# Patient Record
Sex: MALE | Race: WHITE | NOT HISPANIC OR LATINO | URBAN - METROPOLITAN AREA
[De-identification: names, ages, dates, MRNs, and addresses within clinical notes are randomized per-mention and may not be internally consistent; named-entity substitution may affect disease eponyms.]

---

## 2022-09-28 ENCOUNTER — HOSPITAL ENCOUNTER (EMERGENCY)
Facility: HOSPITAL | Age: 62
Discharge: PSYCHIATRIC OR SUBSTANCE USE FACILITY | End: 2022-09-30
Attending: EMERGENCY MEDICINE
Payer: MEDICAID

## 2022-09-28 DIAGNOSIS — F23 ACUTE PSYCHOSIS: Primary | ICD-10-CM

## 2022-09-28 DIAGNOSIS — Z78.9 ADMITTED TO SUBSTANCE MISUSE DETOXIFICATION CENTER: ICD-10-CM

## 2022-09-28 DIAGNOSIS — F10.10 ALCOHOL ABUSE: ICD-10-CM

## 2022-09-28 LAB
ABS NEUT (OLG): 1.97 X10(3)/MCL (ref 2.1–9.2)
ALBUMIN SERPL-MCNC: 4.3 GM/DL (ref 3.4–4.8)
ALBUMIN/GLOB SERPL: 1.3 RATIO (ref 1.1–2)
ALP SERPL-CCNC: 88 UNIT/L (ref 40–150)
ALT SERPL-CCNC: 81 UNIT/L (ref 0–55)
AMPHET UR QL SCN: NEGATIVE
APAP SERPL-MCNC: <17.4 UG/ML (ref 17.4–30)
APPEARANCE UR: CLEAR
AST SERPL-CCNC: 114 UNIT/L (ref 5–34)
BACTERIA #/AREA URNS AUTO: NORMAL /HPF
BARBITURATE SCN PRESENT UR: NEGATIVE
BENZODIAZ UR QL SCN: NEGATIVE
BILIRUB UR QL STRIP.AUTO: NEGATIVE MG/DL
BILIRUBIN DIRECT+TOT PNL SERPL-MCNC: 1 MG/DL
BUN SERPL-MCNC: 8.1 MG/DL (ref 8.4–25.7)
CALCIUM SERPL-MCNC: 9.3 MG/DL (ref 8.8–10)
CANNABINOIDS UR QL SCN: NEGATIVE
CHLORIDE SERPL-SCNC: 104 MMOL/L (ref 98–107)
CO2 SERPL-SCNC: 23 MMOL/L (ref 23–31)
COCAINE UR QL SCN: NEGATIVE
COLOR UR AUTO: YELLOW
CREAT SERPL-MCNC: 0.7 MG/DL (ref 0.73–1.18)
ERYTHROCYTE [DISTWIDTH] IN BLOOD BY AUTOMATED COUNT: 14.6 % (ref 11.5–17)
ETHANOL SERPL-MCNC: 393 MG/DL
ETHANOL SERPL-MCNC: 497 MG/DL
FENTANYL UR QL SCN: NEGATIVE
GFR SERPLBLD CREATININE-BSD FMLA CKD-EPI: >60 MLS/MIN/1.73/M2
GLOBULIN SER-MCNC: 3.2 GM/DL (ref 2.4–3.5)
GLUCOSE SERPL-MCNC: 96 MG/DL (ref 82–115)
GLUCOSE UR QL STRIP.AUTO: NEGATIVE MG/DL
HCT VFR BLD AUTO: 44.7 % (ref 42–52)
HGB BLD-MCNC: 15.7 GM/DL (ref 14–18)
IMM GRANULOCYTES # BLD AUTO: 0.02 X10(3)/MCL (ref 0–0.04)
IMM GRANULOCYTES NFR BLD AUTO: 0.7 %
INSTRUMENT WBC (OLG): 2.9 X10(3)/MCL
KETONES UR QL STRIP.AUTO: NEGATIVE MG/DL
LEUKOCYTE ESTERASE UR QL STRIP.AUTO: NEGATIVE UNIT/L
LYMPHOCYTES NFR BLD MANUAL: 0.7 X10(3)/MCL
LYMPHOCYTES NFR BLD MANUAL: 24 %
MAGNESIUM SERPL-MCNC: 1.8 MG/DL (ref 1.6–2.6)
MCH RBC QN AUTO: 32 PG (ref 27–31)
MCHC RBC AUTO-ENTMCNC: 35.1 MG/DL (ref 33–36)
MCV RBC AUTO: 91 FL (ref 80–94)
MDMA UR QL SCN: NEGATIVE
METAMYELOCYTES NFR BLD MANUAL: 3 %
MONOCYTES NFR BLD MANUAL: 0.23 X10(3)/MCL (ref 0.1–1.3)
MONOCYTES NFR BLD MANUAL: 8 %
NEUTROPHILS NFR BLD MANUAL: 65 %
NITRITE UR QL STRIP.AUTO: NEGATIVE
NRBC BLD AUTO-RTO: 0 %
OPIATES UR QL SCN: NEGATIVE
PCP UR QL: NEGATIVE
PH UR STRIP.AUTO: 7 [PH]
PH UR: 7 [PH] (ref 3–11)
PLATELET # BLD AUTO: 77 X10(3)/MCL (ref 130–400)
PLATELET # BLD EST: ABNORMAL 10*3/UL
PMV BLD AUTO: 10.1 FL (ref 7.4–10.4)
POTASSIUM SERPL-SCNC: 3.8 MMOL/L (ref 3.5–5.1)
PROT SERPL-MCNC: 7.5 GM/DL (ref 5.8–7.6)
PROT UR QL STRIP.AUTO: ABNORMAL MG/DL
RBC # BLD AUTO: 4.91 X10(6)/MCL (ref 4.7–6.1)
RBC #/AREA URNS AUTO: <5 /HPF
RBC MORPH BLD: ABNORMAL
RBC UR QL AUTO: NEGATIVE UNIT/L
SALICYLATES SERPL-MCNC: <5 MG/DL
SARS-COV-2 RDRP RESP QL NAA+PROBE: NEGATIVE
SODIUM SERPL-SCNC: 144 MMOL/L (ref 136–145)
SP GR UR STRIP.AUTO: 1.01 (ref 1–1.03)
SPECIFIC GRAVITY, URINE AUTO (.000) (OHS): 1.01 (ref 1–1.03)
SQUAMOUS #/AREA URNS AUTO: <5 /HPF
TARGETS BLD QL SMEAR: ABNORMAL
TSH SERPL-ACNC: 0.78 UIU/ML (ref 0.35–4.94)
UROBILINOGEN UR STRIP-ACNC: 1 MG/DL
WBC # SPEC AUTO: 2.9 X10(3)/MCL (ref 4.5–11.5)
WBC #/AREA URNS AUTO: <5 /HPF

## 2022-09-28 PROCEDURE — S4991 NICOTINE PATCH NONLEGEND: HCPCS | Performed by: EMERGENCY MEDICINE

## 2022-09-28 PROCEDURE — 36415 COLL VENOUS BLD VENIPUNCTURE: CPT | Performed by: PHYSICIAN ASSISTANT

## 2022-09-28 PROCEDURE — 85025 COMPLETE CBC W/AUTO DIFF WBC: CPT | Performed by: PHYSICIAN ASSISTANT

## 2022-09-28 PROCEDURE — 63600175 PHARM REV CODE 636 W HCPCS: Performed by: EMERGENCY MEDICINE

## 2022-09-28 PROCEDURE — 87635 SARS-COV-2 COVID-19 AMP PRB: CPT | Performed by: EMERGENCY MEDICINE

## 2022-09-28 PROCEDURE — 36415 COLL VENOUS BLD VENIPUNCTURE: CPT | Performed by: EMERGENCY MEDICINE

## 2022-09-28 PROCEDURE — 82077 ASSAY SPEC XCP UR&BREATH IA: CPT | Performed by: PHYSICIAN ASSISTANT

## 2022-09-28 PROCEDURE — A4216 STERILE WATER/SALINE, 10 ML: HCPCS

## 2022-09-28 PROCEDURE — 80179 DRUG ASSAY SALICYLATE: CPT | Performed by: PHYSICIAN ASSISTANT

## 2022-09-28 PROCEDURE — 96372 THER/PROPH/DIAG INJ SC/IM: CPT | Performed by: EMERGENCY MEDICINE

## 2022-09-28 PROCEDURE — 25000003 PHARM REV CODE 250: Performed by: EMERGENCY MEDICINE

## 2022-09-28 PROCEDURE — 99285 EMERGENCY DEPT VISIT HI MDM: CPT | Mod: 25

## 2022-09-28 PROCEDURE — 25000003 PHARM REV CODE 250

## 2022-09-28 PROCEDURE — 83735 ASSAY OF MAGNESIUM: CPT | Performed by: PHYSICIAN ASSISTANT

## 2022-09-28 PROCEDURE — 80143 DRUG ASSAY ACETAMINOPHEN: CPT | Performed by: PHYSICIAN ASSISTANT

## 2022-09-28 PROCEDURE — 84443 ASSAY THYROID STIM HORMONE: CPT | Performed by: PHYSICIAN ASSISTANT

## 2022-09-28 PROCEDURE — 82077 ASSAY SPEC XCP UR&BREATH IA: CPT | Performed by: EMERGENCY MEDICINE

## 2022-09-28 PROCEDURE — 80307 DRUG TEST PRSMV CHEM ANLYZR: CPT | Performed by: PHYSICIAN ASSISTANT

## 2022-09-28 PROCEDURE — 80053 COMPREHEN METABOLIC PANEL: CPT | Performed by: PHYSICIAN ASSISTANT

## 2022-09-28 PROCEDURE — 81001 URINALYSIS AUTO W/SCOPE: CPT | Mod: 59 | Performed by: PHYSICIAN ASSISTANT

## 2022-09-28 RX ORDER — DIPHENHYDRAMINE HYDROCHLORIDE 50 MG/ML
50 INJECTION INTRAMUSCULAR; INTRAVENOUS EVERY 4 HOURS PRN
Status: DISCONTINUED | OUTPATIENT
Start: 2022-09-28 | End: 2022-09-30 | Stop reason: HOSPADM

## 2022-09-28 RX ORDER — ZOLPIDEM TARTRATE 5 MG/1
10 TABLET ORAL NIGHTLY PRN
Status: DISCONTINUED | OUTPATIENT
Start: 2022-09-28 | End: 2022-09-30 | Stop reason: HOSPADM

## 2022-09-28 RX ORDER — WATER FOR INJECTION,STERILE
VIAL (ML) INJECTION
Status: COMPLETED
Start: 2022-09-28 | End: 2022-09-28

## 2022-09-28 RX ORDER — FOLIC ACID 1 MG/1
1 TABLET ORAL DAILY
Status: DISCONTINUED | OUTPATIENT
Start: 2022-09-29 | End: 2022-09-30 | Stop reason: HOSPADM

## 2022-09-28 RX ORDER — ZIPRASIDONE MESYLATE 20 MG/ML
20 INJECTION, POWDER, LYOPHILIZED, FOR SOLUTION INTRAMUSCULAR EVERY 12 HOURS PRN
Status: DISCONTINUED | OUTPATIENT
Start: 2022-09-28 | End: 2022-09-30 | Stop reason: HOSPADM

## 2022-09-28 RX ORDER — THIAMINE HCL 100 MG
100 TABLET ORAL DAILY
Status: DISCONTINUED | OUTPATIENT
Start: 2022-09-29 | End: 2022-09-30 | Stop reason: HOSPADM

## 2022-09-28 RX ORDER — IBUPROFEN 200 MG
1 TABLET ORAL
Status: COMPLETED | OUTPATIENT
Start: 2022-09-28 | End: 2022-09-29

## 2022-09-28 RX ORDER — CHLORDIAZEPOXIDE HYDROCHLORIDE 25 MG/1
100 CAPSULE, GELATIN COATED ORAL ONCE
Status: COMPLETED | OUTPATIENT
Start: 2022-09-28 | End: 2022-09-28

## 2022-09-28 RX ORDER — LORAZEPAM 1 MG/1
2 TABLET ORAL EVERY 4 HOURS PRN
Status: DISCONTINUED | OUTPATIENT
Start: 2022-09-28 | End: 2022-09-30 | Stop reason: HOSPADM

## 2022-09-28 RX ORDER — OLANZAPINE 5 MG/1
10 TABLET, ORALLY DISINTEGRATING ORAL EVERY 8 HOURS PRN
Status: DISCONTINUED | OUTPATIENT
Start: 2022-09-28 | End: 2022-09-30 | Stop reason: HOSPADM

## 2022-09-28 RX ORDER — HALOPERIDOL 5 MG/1
5 TABLET ORAL EVERY 8 HOURS PRN
Status: DISCONTINUED | OUTPATIENT
Start: 2022-09-28 | End: 2022-09-30 | Stop reason: HOSPADM

## 2022-09-28 RX ORDER — HALOPERIDOL 5 MG/ML
5 INJECTION INTRAMUSCULAR EVERY 4 HOURS PRN
Status: DISCONTINUED | OUTPATIENT
Start: 2022-09-28 | End: 2022-09-30 | Stop reason: HOSPADM

## 2022-09-28 RX ORDER — ACETAMINOPHEN 325 MG/1
650 TABLET ORAL EVERY 6 HOURS PRN
Status: DISCONTINUED | OUTPATIENT
Start: 2022-09-28 | End: 2022-09-30 | Stop reason: HOSPADM

## 2022-09-28 RX ORDER — CHLORDIAZEPOXIDE HYDROCHLORIDE 25 MG/1
25 CAPSULE, GELATIN COATED ORAL
Status: DISCONTINUED | OUTPATIENT
Start: 2022-09-30 | End: 2022-09-30 | Stop reason: HOSPADM

## 2022-09-28 RX ORDER — CHLORDIAZEPOXIDE HYDROCHLORIDE 25 MG/1
50 CAPSULE, GELATIN COATED ORAL
Status: COMPLETED | OUTPATIENT
Start: 2022-09-28 | End: 2022-09-29

## 2022-09-28 RX ORDER — CHLORDIAZEPOXIDE HYDROCHLORIDE 25 MG/1
25 CAPSULE, GELATIN COATED ORAL
Status: DISCONTINUED | OUTPATIENT
Start: 2022-10-02 | End: 2022-09-30 | Stop reason: HOSPADM

## 2022-09-28 RX ORDER — DIPHENHYDRAMINE HCL 25 MG
50 CAPSULE ORAL EVERY 4 HOURS PRN
Status: DISCONTINUED | OUTPATIENT
Start: 2022-09-28 | End: 2022-09-30 | Stop reason: HOSPADM

## 2022-09-28 RX ORDER — CHLORDIAZEPOXIDE HYDROCHLORIDE 25 MG/1
25 CAPSULE, GELATIN COATED ORAL
Status: DISCONTINUED | OUTPATIENT
Start: 2022-10-03 | End: 2022-09-30 | Stop reason: HOSPADM

## 2022-09-28 RX ORDER — CHLORDIAZEPOXIDE HYDROCHLORIDE 25 MG/1
50 CAPSULE, GELATIN COATED ORAL
Status: DISCONTINUED | OUTPATIENT
Start: 2022-09-29 | End: 2022-09-30 | Stop reason: HOSPADM

## 2022-09-28 RX ORDER — CHLORDIAZEPOXIDE HYDROCHLORIDE 25 MG/1
25 CAPSULE, GELATIN COATED ORAL
Status: DISCONTINUED | OUTPATIENT
Start: 2022-10-01 | End: 2022-09-30 | Stop reason: HOSPADM

## 2022-09-28 RX ORDER — MAG HYDROX/ALUMINUM HYD/SIMETH 200-200-20
30 SUSPENSION, ORAL (FINAL DOSE FORM) ORAL EVERY 6 HOURS PRN
Status: DISCONTINUED | OUTPATIENT
Start: 2022-09-28 | End: 2022-09-30 | Stop reason: HOSPADM

## 2022-09-28 RX ORDER — LORAZEPAM 1 MG/1
2 TABLET ORAL EVERY 8 HOURS PRN
Status: DISCONTINUED | OUTPATIENT
Start: 2022-09-28 | End: 2022-09-30 | Stop reason: HOSPADM

## 2022-09-28 RX ADMIN — LORAZEPAM 2 MG: 1 TABLET ORAL at 04:09

## 2022-09-28 RX ADMIN — CHLORDIAZEPOXIDE HYDROCHLORIDE 100 MG: 25 CAPSULE ORAL at 05:09

## 2022-09-28 RX ADMIN — ACETAMINOPHEN 650 MG: 325 TABLET ORAL at 10:09

## 2022-09-28 RX ADMIN — CHLORDIAZEPOXIDE HYDROCHLORIDE 50 MG: 25 CAPSULE ORAL at 10:09

## 2022-09-28 RX ADMIN — WATER: 1 INJECTION INTRAMUSCULAR; INTRAVENOUS; SUBCUTANEOUS at 05:09

## 2022-09-28 RX ADMIN — ZIPRASIDONE MESYLATE 20 MG: 20 INJECTION, POWDER, LYOPHILIZED, FOR SOLUTION INTRAMUSCULAR at 05:09

## 2022-09-28 RX ADMIN — NICOTINE 1 PATCH: 21 PATCH, EXTENDED RELEASE TRANSDERMAL at 05:09

## 2022-09-28 NOTE — ED PROVIDER NOTES
"Encounter Date: 9/28/2022    SCRIBE #1 NOTE: I, Beth Cruz, am scribing for, and in the presence of,  Natty Terry, DO. I have scribed the following portions of the note - Other sections scribed: HPI, ROS, PE.     History     Chief Complaint   Patient presents with    Alcohol Problem     Pt presents requesting ETOH detox. Denies SI/HI.  / alcohol of choice, whiskey.       63 y/o male with history of alcohol abuse presents to the ED for psychiatric evaluation and alcohol problem. Pt reports that he is a [employer], stating "you don't want to know" when asked what happened to bring him in to the ED. He repeatedly stated that he is "tired of fighting this fight every day" and that the things he has seen "get in your head and destroys you." He says he drinks every day, and when asked how much he drinks he said, "enough to knock me down." [facility]" when asked if he wants help with his drinking problem. He denies thoughts of hurting himself but says he "has thoughts of defending myself from the other warriors and everyone around me." He denies nausea.     The history is provided by the patient. No  was used.   Mental Health Problem  The primary symptoms include agitation. The primary symptoms do not include suicidal ideas. This is a chronic problem.   The onset of the illness is precipitated by alcohol abuse and stressful event. The degree of incapacity that he is experiencing as a consequence of his illness is severe. Additional symptoms of the illness include agitation. Additional symptoms of the illness do not include appetite change, abdominal pain or seizures. He does not admit to suicidal ideas. He does not contemplate harming himself. Risk factors that are present for mental illness include substance abuse.   Review of patient's allergies indicates:  No Known Allergies  Past Medical History:   Diagnosis Date    " Alcohol abuse     Crohn disease      History reviewed. No pertinent surgical history.  History reviewed. No pertinent family history.  Social History     Tobacco Use    Smoking status: Every Day     Types: Cigarettes   Substance Use Topics    Alcohol use: Yes     Comment: daily     Review of Systems   Constitutional:  Negative for appetite change, chills and fever.   HENT:  Negative for congestion and sore throat.    Eyes:  Negative for pain and visual disturbance.   Respiratory:  Negative for cough and shortness of breath.    Cardiovascular:  Negative for chest pain and leg swelling.   Gastrointestinal:  Negative for abdominal pain, diarrhea, nausea and vomiting.   Genitourinary:  Negative for dysuria and hematuria.   Skin:  Negative for rash and wound.   Allergic/Immunologic: Negative for food allergies and immunocompromised state.   Neurological:  Negative for seizures, syncope and weakness.   Psychiatric/Behavioral:  Positive for agitation. Negative for suicidal ideas.      Physical Exam     Initial Vitals [09/28/22 1545]   BP Pulse Resp Temp SpO2   (!) 143/91 101 (!) 22 97.2 °F (36.2 °C) 95 %      MAP       --         Physical Exam    Constitutional: He appears well-developed and well-nourished. He is not diaphoretic.  Non-toxic appearance. He does not appear ill. No distress.   HENT:   Head: Normocephalic and atraumatic.   Right Ear: External ear normal.   Left Ear: External ear normal.   Nose: Nose normal.   Mouth/Throat: No trismus in the jaw.   Eyes: EOM are normal. Pupils are equal, round, and reactive to light. Right conjunctiva is injected. Left conjunctiva is injected. No scleral icterus.   Neck: Neck supple. No tracheal deviation present.   Normal range of motion.  Cardiovascular:  Normal rate, regular rhythm, normal heart sounds and normal pulses.           Pulmonary/Chest: Effort normal and breath sounds normal.   Abdominal: Abdomen is soft and flat. There is no abdominal tenderness.    Musculoskeletal:         General: No edema.      Cervical back: Normal range of motion and neck supple.      Right lower leg: No edema.      Left lower leg: No edema.     Neurological: He is alert and oriented to person, place, and time. GCS eye subscore is 4. GCS verbal subscore is 5. GCS motor subscore is 6.   Slurred speech   Skin: Skin is warm and dry. Capillary refill takes less than 2 seconds. No rash noted. No pallor.   Psychiatric: His affect is angry. He is agitated and actively hallucinating. He expresses impulsivity. He exhibits a depressed mood. He expresses no suicidal ideation.       ED Course   Procedures  Labs Reviewed   COMPREHENSIVE METABOLIC PANEL - Abnormal; Notable for the following components:       Result Value    Blood Urea Nitrogen 8.1 (*)     Creatinine 0.70 (*)     Alanine Aminotransferase 81 (*)     Aspartate Aminotransferase 114 (*)     All other components within normal limits   URINALYSIS, REFLEX TO URINE CULTURE - Abnormal; Notable for the following components:    Protein, UA 1+ (*)     All other components within normal limits   ALCOHOL,MEDICAL (ETHANOL) - Abnormal; Notable for the following components:    Ethanol Level 497.0 (*)     All other components within normal limits   ACETAMINOPHEN LEVEL - Abnormal; Notable for the following components:    Acetaminophen Level <17.4 (*)     All other components within normal limits   CBC WITH DIFFERENTIAL - Abnormal; Notable for the following components:    WBC 2.9 (*)     MCH 32.0 (*)     Platelet 77 (*)     All other components within normal limits   MANUAL DIFFERENTIAL - Abnormal; Notable for the following components:    Abs Neut 1.972 (*)     RBC Morph Abnormal (*)     Target Cell 1+ (*)     Platelet Est Decreased (*)     All other components within normal limits   ALCOHOL,MEDICAL (ETHANOL) - Abnormal; Notable for the following components:    Ethanol Level 393.0 (*)     All other components within normal limits   ALCOHOL,MEDICAL  (ETHANOL) - Abnormal; Notable for the following components:    Ethanol Level 281.0 (*)     All other components within normal limits   ALCOHOL,MEDICAL (ETHANOL) - Abnormal; Notable for the following components:    Ethanol Level 137.0 (*)     All other components within normal limits   TSH - Normal   DRUG SCREEN, URINE (BEAKER) - Normal    Narrative:     Cut off concentrations:    Amphetamines - 1000 ng/ml  Barbiturates - 200 ng/ml  Benzodiazepine - 200 ng/ml  Cannabinoids (THC) - 50 ng/ml  Cocaine - 300 ng/ml  Fentanyl - 1.0 ng/ml  MDMA - 500 ng/ml  Opiates - 300 ng/ml   Phencyclidine (PCP) - 25 ng/ml    Specimen submitted for drug analysis and tested for pH and specific gravity in order to evaluate sample integrity. Suspect tampering if specific gravity is <1.003 and/or pH is not within the range of 4.5 - 8.0  False negatives may result form substances such as bleach added to urine.  False positives may result for the presence of a substance with similar chemical structure to the drug or its metabolite.    This test provides only a PRELIMINARY analytical test result. A more specific alternate chemical method must be used in order to obtain a confirmed analytical result. Gas chromatography/mass spectrometry (GC/MS) is the preferred confirmatory method. Other chemical confirmation methods are available. Clinical consideration and professional judgement should be applied to any drug of abuse test result, particularly when preliminary positive results are used.    Positive results will be confirmed only at the physicians request. Unconfirmed screening results are to be used only for medical purposes (treatment).        MAGNESIUM - Normal   URINALYSIS, MICROSCOPIC - Normal   SARS-COV-2 RNA AMPLIFICATION, QUAL - Normal   CBC W/ AUTO DIFFERENTIAL    Narrative:     The following orders were created for panel order CBC auto differential.  Procedure                               Abnormality         Status                      ---------                               -----------         ------                     CBC with Differential[[id number]]        Abnormal            Final result               Manual Differential[[id number]]          Abnormal            Final result                 Please view results for these tests on the individual orders.   SALICYLATE LEVEL          Imaging Results    None          Medications   ziprasidone injection 20 mg (20 mg Intramuscular Given 9/28/22 1701)   LORazepam tablet 2 mg (has no administration in time range)   olanzapine zydis disintegrating tablet 10 mg (has no administration in time range)   acetaminophen tablet 650 mg (650 mg Oral Given 9/28/22 2257)   aluminum-magnesium hydroxide-simethicone 200-200-20 mg/5 mL suspension 30 mL (has no administration in time range)   zolpidem tablet 10 mg (10 mg Oral Given 9/29/22 0351)   haloperidoL tablet 5 mg (has no administration in time range)   LORazepam tablet 2 mg (2 mg Oral Given 9/29/22 0806)   diphenhydrAMINE capsule 50 mg (has no administration in time range)   haloperidol lactate injection 5 mg (has no administration in time range)   diphenhydrAMINE injection 50 mg (has no administration in time range)   multivitamin tablet (has no administration in time range)   folic acid tablet 1 mg (has no administration in time range)   thiamine tablet 100 mg (has no administration in time range)   chlordiazepoxide capsule 100 mg (100 mg Oral Given 9/28/22 1715)     Followed by   chlordiazepoxide capsule 50 mg (50 mg Oral Given 9/29/22 0551)     Followed by   chlordiazepoxide capsule 50 mg (has no administration in time range)     Followed by   chlordiazepoxide capsule 25 mg (has no administration in time range)     Followed by   chlordiazepoxide capsule 25 mg (has no administration in time range)     Followed by   chlordiazepoxide capsule 25 mg (has no administration in time range)     Followed by   chlordiazepoxide capsule 25 mg (has no administration in  time range)   nicotine 21 mg/24 hr 1 patch (1 patch Transdermal Patch Applied 9/28/22 1730)   sterile water for injection injection (  Given 9/28/22 1715)     Medical Decision Making:   Initial Assessment:   63 yo male here for alcohol detox, he is currently intoxicated,  hallucinating, irrational. He appears gravely disabled so put under [facility]   Clinical Tests:   Lab Tests: Ordered and Reviewed       <> Summary of Lab: Minimal LFT elevation, thrombocytopenia, elevated alcohol level  ED Management:  Started Librium taper   Will observe until alcohol level is below psych requirements   He is medically clear for psych facility           Attending Attestation:           Physician Attestation for Scribe:  Physician Attestation Statement for Scribe #1: I, Natty Terry, DO, reviewed documentation, as scribed by Beth Cruz in my presence, and it is both accurate and complete.           ED Course as of 09/29/22 0900   Wed Sep 28, 2022   1734 Alcohol, Serum(!!): 497.0 [KM]   2034 Alcohol, Serum(!!): 393.0  Will repeat in 6 hours  [KM]   u Sep 29, 2022   0418 Alcohol, Serum(!): 281.0  Will repeat at 0700   [RB]   0845 Alcohol, Serum(!): 137.0  Patient is resting comfortably at this time, receiving Serax protocol. Alcohol level at goal. He is medically clear for transfer to a psychiatric facility for further evaluation.  [RB]      ED Course User Index  [KM] Natty Terry MD  [RB] Piedad Rodríguez MD                 Clinical Impression:   Final diagnoses:  [F10.10] Alcohol abuse  [F23] Acute psychosis (Primary)             Piedad Rodríguez MD  09/29/22 0900

## 2022-09-28 NOTE — FIRST PROVIDER EVALUATION
Medical screening examination initiated.  I have conducted a focused provider triage encounter, findings are as follows:    Chief Complaint   Patient presents with    Alcohol Problem     Pt presents requesting ETOH detox. Denies SI/HI.  / alcohol of choice, whiskey.       Brief history of present illness:  62 y.o. male presents to the ED with friend for ETOH detox.     Vitals:    09/28/22 1545   BP: (!) 143/91   BP Location: Left arm   Patient Position: Sitting   Pulse: 101   Resp: (!) 22   Temp: 97.2 °F (36.2 °C)   TempSrc: Temporal   SpO2: 95%   Weight: 90.7 kg (200 lb)     Pertinent physical exam:  Awake, alert, non-labored respirations    Brief workup plan:  labs, UA    Preliminary workup initiated; this workup will be continued and followed by the physician or advanced practice provider that is assigned to the patient when roomed.

## 2022-09-29 LAB
ETHANOL SERPL-MCNC: 137 MG/DL
ETHANOL SERPL-MCNC: 281 MG/DL
ETHANOL SERPL-MCNC: <10 MG/DL

## 2022-09-29 PROCEDURE — 93010 EKG 12-LEAD: ICD-10-PCS | Mod: ,,, | Performed by: INTERNAL MEDICINE

## 2022-09-29 PROCEDURE — 36415 COLL VENOUS BLD VENIPUNCTURE: CPT | Performed by: EMERGENCY MEDICINE

## 2022-09-29 PROCEDURE — 25000003 PHARM REV CODE 250: Performed by: EMERGENCY MEDICINE

## 2022-09-29 PROCEDURE — 63600175 PHARM REV CODE 636 W HCPCS

## 2022-09-29 PROCEDURE — 82077 ASSAY SPEC XCP UR&BREATH IA: CPT | Performed by: EMERGENCY MEDICINE

## 2022-09-29 PROCEDURE — 96374 THER/PROPH/DIAG INJ IV PUSH: CPT

## 2022-09-29 PROCEDURE — 93010 ELECTROCARDIOGRAM REPORT: CPT | Mod: ,,, | Performed by: INTERNAL MEDICINE

## 2022-09-29 PROCEDURE — 93005 ELECTROCARDIOGRAM TRACING: CPT

## 2022-09-29 RX ORDER — ONDANSETRON 2 MG/ML
INJECTION INTRAMUSCULAR; INTRAVENOUS
Status: COMPLETED
Start: 2022-09-29 | End: 2022-09-29

## 2022-09-29 RX ORDER — ONDANSETRON 2 MG/ML
4 INJECTION INTRAMUSCULAR; INTRAVENOUS
Status: COMPLETED | OUTPATIENT
Start: 2022-09-29 | End: 2022-09-29

## 2022-09-29 RX ADMIN — LORAZEPAM 2 MG: 1 TABLET ORAL at 02:09

## 2022-09-29 RX ADMIN — ONDANSETRON 4 MG: 2 INJECTION INTRAMUSCULAR; INTRAVENOUS at 11:09

## 2022-09-29 RX ADMIN — CHLORDIAZEPOXIDE HYDROCHLORIDE 50 MG: 25 CAPSULE ORAL at 10:09

## 2022-09-29 RX ADMIN — CHLORDIAZEPOXIDE HYDROCHLORIDE 50 MG: 25 CAPSULE ORAL at 05:09

## 2022-09-29 RX ADMIN — LORAZEPAM 2 MG: 1 TABLET ORAL at 07:09

## 2022-09-29 RX ADMIN — ZOLPIDEM TARTRATE 10 MG: 5 TABLET ORAL at 03:09

## 2022-09-29 RX ADMIN — Medication 100 MG: at 10:09

## 2022-09-29 RX ADMIN — THERA TABS 1 TABLET: TAB at 10:09

## 2022-09-29 RX ADMIN — FOLIC ACID 1 MG: 1 TABLET ORAL at 10:09

## 2022-09-29 RX ADMIN — LORAZEPAM 2 MG: 1 TABLET ORAL at 08:09

## 2022-09-30 VITALS
SYSTOLIC BLOOD PRESSURE: 156 MMHG | DIASTOLIC BLOOD PRESSURE: 116 MMHG | WEIGHT: 200 LBS | HEART RATE: 85 BPM | OXYGEN SATURATION: 98 % | TEMPERATURE: 98 F | RESPIRATION RATE: 16 BRPM

## 2022-09-30 PROCEDURE — 25000003 PHARM REV CODE 250: Performed by: EMERGENCY MEDICINE

## 2022-09-30 RX ADMIN — CHLORDIAZEPOXIDE HYDROCHLORIDE 50 MG: 25 CAPSULE ORAL at 07:09

## 2022-09-30 RX ADMIN — THERA TABS 1 TABLET: TAB at 09:09

## 2022-09-30 RX ADMIN — Medication 100 MG: at 09:09

## 2022-09-30 RX ADMIN — FOLIC ACID 1 MG: 1 TABLET ORAL at 09:09

## 2022-09-30 NOTE — ED NOTES
Pt requesting to be [facility]. [place] notified of pt request. Informed by [facility] [place] [facility]  [facility]. Pt verbalized understanding.

## 2022-09-30 NOTE — ED NOTES
Left forearm injury close to left wrist while playing ice hockey tonight. Pt was tripped and fell on the ice with his hand hyperextended. +CMS. Pain with movement. Report called to GARCÍA Campbell, report given to Cordell. Accepted by Olman Knight. [facility] notified of need for transport of [facility] patient.

## 2022-11-09 ENCOUNTER — HOSPITAL ENCOUNTER (EMERGENCY)
Facility: HOSPITAL | Age: 62
Discharge: PSYCHIATRIC OR SUBSTANCE USE FACILITY | End: 2022-11-10
Attending: STUDENT IN AN ORGANIZED HEALTH CARE EDUCATION/TRAINING PROGRAM
Payer: MEDICAID

## 2022-11-09 DIAGNOSIS — F10.129 ALCOHOL ABUSE WITH INTOXICATION: ICD-10-CM

## 2022-11-09 DIAGNOSIS — F99 PSYCHIATRIC SYMPTOMS: ICD-10-CM

## 2022-11-09 DIAGNOSIS — F43.10 PTSD (POST-TRAUMATIC STRESS DISORDER): Primary | ICD-10-CM

## 2022-11-09 DIAGNOSIS — Z00.8 MEDICAL CLEARANCE FOR PSYCHIATRIC ADMISSION: ICD-10-CM

## 2022-11-09 LAB
ABS NEUT (OLG): 1.75 X10(3)/MCL (ref 2.1–9.2)
ALBUMIN SERPL-MCNC: 4.3 GM/DL (ref 3.4–4.8)
ALBUMIN/GLOB SERPL: 1.3 RATIO (ref 1.1–2)
ALP SERPL-CCNC: 77 UNIT/L (ref 40–150)
ALT SERPL-CCNC: 61 UNIT/L (ref 0–55)
AMPHET UR QL SCN: NEGATIVE
APAP SERPL-MCNC: <17.4 UG/ML (ref 17.4–30)
APPEARANCE UR: CLEAR
AST SERPL-CCNC: 75 UNIT/L (ref 5–34)
BACTERIA #/AREA URNS AUTO: NORMAL /HPF
BARBITURATE SCN PRESENT UR: NEGATIVE
BASOPHILS NFR BLD MANUAL: 0.11 X10(3)/MCL (ref 0–0.2)
BASOPHILS NFR BLD MANUAL: 3 %
BENZODIAZ UR QL SCN: NEGATIVE
BILIRUB UR QL STRIP.AUTO: NEGATIVE MG/DL
BILIRUBIN DIRECT+TOT PNL SERPL-MCNC: 0.8 MG/DL
BUN SERPL-MCNC: 7.1 MG/DL (ref 8.4–25.7)
CALCIUM SERPL-MCNC: 9.8 MG/DL (ref 8.8–10)
CANNABINOIDS UR QL SCN: NEGATIVE
CHLORIDE SERPL-SCNC: 110 MMOL/L (ref 98–107)
CO2 SERPL-SCNC: 25 MMOL/L (ref 23–31)
COCAINE UR QL SCN: NEGATIVE
COLOR UR AUTO: YELLOW
CREAT SERPL-MCNC: 0.74 MG/DL (ref 0.73–1.18)
EOSINOPHIL NFR BLD MANUAL: 0.11 X10(3)/MCL (ref 0–0.9)
EOSINOPHIL NFR BLD MANUAL: 3 %
ERYTHROCYTE [DISTWIDTH] IN BLOOD BY AUTOMATED COUNT: 14.4 % (ref 11.5–17)
ETHANOL SERPL-MCNC: 275 MG/DL
ETHANOL SERPL-MCNC: 402 MG/DL
ETHANOL SERPL-MCNC: 86 MG/DL
FENTANYL UR QL SCN: NEGATIVE
FLUAV AG UPPER RESP QL IA.RAPID: NOT DETECTED
FLUBV AG UPPER RESP QL IA.RAPID: NOT DETECTED
GFR SERPLBLD CREATININE-BSD FMLA CKD-EPI: >60 MLS/MIN/1.73/M2
GLOBULIN SER-MCNC: 3.2 GM/DL (ref 2.4–3.5)
GLUCOSE SERPL-MCNC: 103 MG/DL (ref 82–115)
GLUCOSE UR QL STRIP.AUTO: NEGATIVE MG/DL
HCT VFR BLD AUTO: 43.5 % (ref 42–52)
HGB BLD-MCNC: 14.8 GM/DL (ref 14–18)
IMM GRANULOCYTES # BLD AUTO: 0.01 X10(3)/MCL (ref 0–0.04)
IMM GRANULOCYTES NFR BLD AUTO: 0.3 %
INSTRUMENT WBC (OLG): 3.8 X10(3)/MCL
KETONES UR QL STRIP.AUTO: NEGATIVE MG/DL
LEUKOCYTE ESTERASE UR QL STRIP.AUTO: ABNORMAL UNIT/L
LYMPHOCYTES NFR BLD MANUAL: 0.91 X10(3)/MCL
LYMPHOCYTES NFR BLD MANUAL: 24 %
MCH RBC QN AUTO: 32 PG (ref 27–31)
MCHC RBC AUTO-ENTMCNC: 34 MG/DL (ref 33–36)
MCV RBC AUTO: 94 FL (ref 80–94)
MDMA UR QL SCN: NEGATIVE
MONOCYTES NFR BLD MANUAL: 0.99 X10(3)/MCL (ref 0.1–1.3)
MONOCYTES NFR BLD MANUAL: 26 %
NEUTROPHILS NFR BLD MANUAL: 46 %
NITRITE UR QL STRIP.AUTO: NEGATIVE
NRBC BLD AUTO-RTO: 0 %
OPIATES UR QL SCN: NEGATIVE
PCP UR QL: NEGATIVE
PH UR STRIP.AUTO: 6 [PH]
PH UR: 6 [PH] (ref 3–11)
PLATELET # BLD AUTO: 355 X10(3)/MCL (ref 130–400)
PLATELET # BLD EST: NORMAL 10*3/UL
PMV BLD AUTO: 9.3 FL (ref 7.4–10.4)
POTASSIUM SERPL-SCNC: 4.2 MMOL/L (ref 3.5–5.1)
PROT SERPL-MCNC: 7.5 GM/DL (ref 5.8–7.6)
PROT UR QL STRIP.AUTO: NEGATIVE MG/DL
RBC # BLD AUTO: 4.63 X10(6)/MCL (ref 4.7–6.1)
RBC #/AREA URNS AUTO: <5 /HPF
RBC MORPH BLD: ABNORMAL
RBC UR QL AUTO: NEGATIVE UNIT/L
SALICYLATES SERPL-MCNC: <5 MG/DL
SARS-COV-2 RNA RESP QL NAA+PROBE: NOT DETECTED
SODIUM SERPL-SCNC: 145 MMOL/L (ref 136–145)
SP GR UR STRIP.AUTO: 1.01 (ref 1–1.03)
SPECIFIC GRAVITY, URINE AUTO (.000) (OHS): 1.01 (ref 1–1.03)
SQUAMOUS #/AREA URNS AUTO: <5 /HPF
TARGETS BLD QL SMEAR: ABNORMAL
TSH SERPL-ACNC: 0.6 UIU/ML (ref 0.35–4.94)
UROBILINOGEN UR STRIP-ACNC: 0.2 MG/DL
WBC # SPEC AUTO: 3.8 X10(3)/MCL (ref 4.5–11.5)
WBC #/AREA URNS AUTO: <5 /HPF

## 2022-11-09 PROCEDURE — 81001 URINALYSIS AUTO W/SCOPE: CPT | Performed by: EMERGENCY MEDICINE

## 2022-11-09 PROCEDURE — 80143 DRUG ASSAY ACETAMINOPHEN: CPT | Performed by: EMERGENCY MEDICINE

## 2022-11-09 PROCEDURE — 0241U COVID/FLU A&B PCR: CPT | Performed by: STUDENT IN AN ORGANIZED HEALTH CARE EDUCATION/TRAINING PROGRAM

## 2022-11-09 PROCEDURE — 99285 EMERGENCY DEPT VISIT HI MDM: CPT | Mod: 25

## 2022-11-09 PROCEDURE — 93010 EKG 12-LEAD: ICD-10-PCS | Mod: ,,, | Performed by: INTERNAL MEDICINE

## 2022-11-09 PROCEDURE — 84443 ASSAY THYROID STIM HORMONE: CPT | Performed by: EMERGENCY MEDICINE

## 2022-11-09 PROCEDURE — 85007 BL SMEAR W/DIFF WBC COUNT: CPT | Performed by: EMERGENCY MEDICINE

## 2022-11-09 PROCEDURE — 93010 ELECTROCARDIOGRAM REPORT: CPT | Mod: ,,, | Performed by: INTERNAL MEDICINE

## 2022-11-09 PROCEDURE — 25000003 PHARM REV CODE 250: Performed by: EMERGENCY MEDICINE

## 2022-11-09 PROCEDURE — 80179 DRUG ASSAY SALICYLATE: CPT | Performed by: EMERGENCY MEDICINE

## 2022-11-09 PROCEDURE — 82077 ASSAY SPEC XCP UR&BREATH IA: CPT | Performed by: EMERGENCY MEDICINE

## 2022-11-09 PROCEDURE — 80053 COMPREHEN METABOLIC PANEL: CPT | Performed by: EMERGENCY MEDICINE

## 2022-11-09 PROCEDURE — 80307 DRUG TEST PRSMV CHEM ANLYZR: CPT | Performed by: EMERGENCY MEDICINE

## 2022-11-09 PROCEDURE — 93005 ELECTROCARDIOGRAM TRACING: CPT

## 2022-11-09 RX ORDER — LORAZEPAM 1 MG/1
2 TABLET ORAL EVERY 4 HOURS PRN
Status: DISCONTINUED | OUTPATIENT
Start: 2022-11-09 | End: 2022-11-10 | Stop reason: HOSPADM

## 2022-11-09 RX ORDER — CHLORDIAZEPOXIDE HYDROCHLORIDE 25 MG/1
25 CAPSULE, GELATIN COATED ORAL 4 TIMES DAILY PRN
Status: DISCONTINUED | OUTPATIENT
Start: 2022-11-09 | End: 2022-11-10 | Stop reason: HOSPADM

## 2022-11-09 RX ORDER — DIPHENHYDRAMINE HYDROCHLORIDE 50 MG/ML
50 INJECTION INTRAMUSCULAR; INTRAVENOUS EVERY 4 HOURS PRN
Status: DISCONTINUED | OUTPATIENT
Start: 2022-11-09 | End: 2022-11-10 | Stop reason: HOSPADM

## 2022-11-09 RX ORDER — HALOPERIDOL 5 MG/1
5 TABLET ORAL EVERY 4 HOURS PRN
Status: DISCONTINUED | OUTPATIENT
Start: 2022-11-09 | End: 2022-11-10 | Stop reason: HOSPADM

## 2022-11-09 RX ORDER — CHLORDIAZEPOXIDE HYDROCHLORIDE 25 MG/1
100 CAPSULE, GELATIN COATED ORAL
Status: COMPLETED | OUTPATIENT
Start: 2022-11-09 | End: 2022-11-09

## 2022-11-09 RX ORDER — ZIPRASIDONE MESYLATE 20 MG/ML
10 INJECTION, POWDER, LYOPHILIZED, FOR SOLUTION INTRAMUSCULAR
Status: DISPENSED | OUTPATIENT
Start: 2022-11-09 | End: 2022-11-09

## 2022-11-09 RX ORDER — FOLIC ACID 1 MG/1
1 TABLET ORAL DAILY
Status: DISCONTINUED | OUTPATIENT
Start: 2022-11-09 | End: 2022-11-10 | Stop reason: HOSPADM

## 2022-11-09 RX ORDER — DIPHENHYDRAMINE HCL 25 MG
50 CAPSULE ORAL EVERY 4 HOURS PRN
Status: DISCONTINUED | OUTPATIENT
Start: 2022-11-09 | End: 2022-11-10 | Stop reason: HOSPADM

## 2022-11-09 RX ORDER — IBUPROFEN 200 MG
1 TABLET ORAL DAILY
Status: DISCONTINUED | OUTPATIENT
Start: 2022-11-09 | End: 2022-11-10 | Stop reason: HOSPADM

## 2022-11-09 RX ADMIN — THERA TABS 1 TABLET: TAB at 11:11

## 2022-11-09 RX ADMIN — CHLORDIAZEPOXIDE HYDROCHLORIDE 25 MG: 25 CAPSULE ORAL at 08:11

## 2022-11-09 RX ADMIN — LORAZEPAM 2 MG: 1 TABLET ORAL at 08:11

## 2022-11-09 RX ADMIN — CHLORDIAZEPOXIDE HYDROCHLORIDE 100 MG: 25 CAPSULE ORAL at 11:11

## 2022-11-09 RX ADMIN — FOLIC ACID 1 MG: 1 TABLET ORAL at 11:11

## 2022-11-09 NOTE — ED PROVIDER NOTES
Encounter Date: 11/9/2022       History     Chief Complaint   Patient presents with    Alcohol Intoxication     +ETOH - reports drinking since 0300. Unsure how much he drank. Hx of PTSD. Denies SI/HI. Reports having flashbacks of trauma he has dealt with in life. Pt seeking help.      62-year-old male with history of PTSD and alcohol abuse presenting today with alcohol intoxication.  Patient has been drinking since 3:00 a.m. an attempt to get rid of his flashbacks.  He denies suicidal intent.  He reports he was in the [place] and saw terrible things that we do not understand. He is asking for help because he does not want to die a failure.     The history is provided by the EMS personnel and the patient.   Mental Health Problem  The primary symptoms include aggression, delusions and hallucinations. The primary symptoms do not include suicidal ideas. The current episode started today. This is a recurrent problem.   The onset of the illness is precipitated by alcohol abuse, emotional stress and stressful event. The degree of incapacity that he is experiencing as a consequence of his illness is severe. Additional symptoms of the illness include feelings of worthlessness and poor judgment. He does not admit to suicidal ideas. He does not have a plan to attempt suicide. He does not contemplate harming himself. He has not already injured self. He does not contemplate injuring another person. He has not already  injured another person. Risk factors that are present for mental illness include a history of mental illness and substance abuse.   Review of patient's allergies indicates:  No Known Allergies  Past Medical History:   Diagnosis Date    Alcohol abuse     Crohn disease     PTSD (post-traumatic stress disorder)      History reviewed. No pertinent surgical history.  History reviewed. No pertinent family history.  Social History     Tobacco Use    Smoking status: Every Day     Types: Cigarettes   Substance Use Topics     Alcohol use: Yes     Comment: daily     Review of Systems   Unable to perform ROS: Other (intoxicated)   Constitutional:  Negative for fever.   Gastrointestinal:  Negative for diarrhea and vomiting.   Psychiatric/Behavioral:  Positive for hallucinations. Negative for suicidal ideas.      Physical Exam     Initial Vitals [11/09/22 0927]   BP Pulse Resp Temp SpO2   (!) 147/94 87 15 98.1 °F (36.7 °C) 97 %      MAP       --         Physical Exam    Nursing note and vitals reviewed.  Constitutional: He appears well-developed and well-nourished. He is not diaphoretic. No distress.   HENT:   Head: Normocephalic and atraumatic.   Right Ear: External ear normal.   Left Ear: External ear normal.   Eyes: Conjunctivae are normal.   Neck: Neck supple.   Normal range of motion.  Cardiovascular:  Normal rate.           Pulmonary/Chest: No respiratory distress.   Abdominal: He exhibits no distension.   Musculoskeletal:         General: Normal range of motion.      Cervical back: Normal range of motion and neck supple.     Neurological: He is alert and oriented to person, place, and time. He has normal strength. GCS score is 15. GCS eye subscore is 4. GCS verbal subscore is 5. GCS motor subscore is 6.   Intoxicated    Skin: Skin is warm and dry.   Psychiatric: His affect is angry. His speech is slurred. He is agitated. Thought content is delusional. He expresses impulsivity and inappropriate judgment. He expresses no homicidal and no suicidal ideation.       ED Course   Procedures  Labs Reviewed   COMPREHENSIVE METABOLIC PANEL - Abnormal; Notable for the following components:       Result Value    Chloride 110 (*)     Blood Urea Nitrogen 7.1 (*)     Alanine Aminotransferase 61 (*)     Aspartate Aminotransferase 75 (*)     All other components within normal limits   URINALYSIS, REFLEX TO URINE CULTURE - Abnormal; Notable for the following components:    Leukocyte Esterase, UA Trace (*)     All other components within normal limits    ALCOHOL,MEDICAL (ETHANOL) - Abnormal; Notable for the following components:    Ethanol Level 402.0 (*)     All other components within normal limits   ACETAMINOPHEN LEVEL - Abnormal; Notable for the following components:    Acetaminophen Level <17.4 (*)     All other components within normal limits   CBC WITH DIFFERENTIAL - Abnormal; Notable for the following components:    WBC 3.8 (*)     RBC 4.63 (*)     MCH 32.0 (*)     All other components within normal limits   MANUAL DIFFERENTIAL - Abnormal; Notable for the following components:    Abs Neut 1.748 (*)     RBC Morph Abnormal (*)     Target Cell 2+ (*)     All other components within normal limits   TSH - Normal   DRUG SCREEN, URINE (BEAKER) - Normal    Narrative:     Cut off concentrations:    Amphetamines - 1000 ng/ml  Barbiturates - 200 ng/ml  Benzodiazepine - 200 ng/ml  Cannabinoids (THC) - 50 ng/ml  Cocaine - 300 ng/ml  Fentanyl - 1.0 ng/ml  MDMA - 500 ng/ml  Opiates - 300 ng/ml   Phencyclidine (PCP) - 25 ng/ml    Specimen submitted for drug analysis and tested for pH and specific gravity in order to evaluate sample integrity. Suspect tampering if specific gravity is <1.003 and/or pH is not within the range of 4.5 - 8.0  False negatives may result form substances such as bleach added to urine.  False positives may result for the presence of a substance with similar chemical structure to the drug or its metabolite.    This test provides only a PRELIMINARY analytical test result. A more specific alternate chemical method must be used in order to obtain a confirmed analytical result. Gas chromatography/mass spectrometry (GC/MS) is the preferred confirmatory method. Other chemical confirmation methods are available. Clinical consideration and professional judgement should be applied to any drug of abuse test result, particularly when preliminary positive results are used.    Positive results will be confirmed only at the physicians request. Unconfirmed  screening results are to be used only for medical purposes (treatment).        COVID/FLU A&B PCR - Normal    Narrative:     The Xpert Xpress SARS-CoV-2/FLU/RSV plus is a rapid, multiplexed real-time PCR test intended for the simultaneous qualitative detection and differentiation of SARS-CoV-2, Influenza A, Influenza B, and respiratory syncytial virus (RSV) viral RNA in either nasopharyngeal swab or nasal swab specimens.         URINALYSIS, MICROSCOPIC - Normal   CBC W/ AUTO DIFFERENTIAL    Narrative:     The following orders were created for panel order CBC auto differential.  Procedure                               Abnormality         Status                     ---------                               -----------         ------                     CBC with Differential[[id number]]        Abnormal            Final result               Manual Differential[[id number]]          Abnormal            Final result                 Please view results for these tests on the individual orders.   SALICYLATE LEVEL   SARS CORONAVIRUS 2 ANTIGEN POCT, MANUAL READ        ECG Results              EKG 12-lead (Final result)  Result time 11/09/22 12:02:45      Final result by Interface, Lab In [facility] (11/09/22 12:02:45)                   Narrative:    Test Reason : F99,    Vent. Rate : 083 BPM     Atrial Rate : 083 BPM     P-R Int : 192 ms          QRS Dur : 092 ms      QT Int : 370 ms       P-R-T Axes : 015 023 053 degrees     QTc Int : 434 ms    Normal sinus rhythm  Normal ECG  When compared with ECG of 29-SEP-2022 10:08,  The axis Shifted left  Confirmed by Briana CEDENO, Jac ([id number]) on 11/9/2022 12:02:29 PM    Referred By: [email or web address]   SELF           Confirmed By:Jac Warren MD                                  Imaging Results              X-Ray Chest 1 View (Final result)  Result time 11/09/22 12:48:22      Final result by Jacques Lozano MD (11/09/22 12:48:22)                   Impression:      No acute cardiopulmonary  process.      Electronically signed by: Jacques Lozano  Date:    11/09/2022  Time:    12:48               Narrative:    EXAMINATION:  XR CHEST 1 VIEW    CLINICAL HISTORY:  Encounter for other general examination    TECHNIQUE:  Single view of the chest    COMPARISON:  09/29/2022    FINDINGS:  No focal opacification, pleural effusion, or pneumothorax.    The cardiomediastinal silhouette is within normal limits.    No acute osseous abnormality.                                       Medications   haloperidoL tablet 5 mg (has no administration in time range)   LORazepam tablet 2 mg (has no administration in time range)   diphenhydrAMINE capsule 50 mg (has no administration in time range)   diphenhydrAMINE injection 50 mg (has no administration in time range)   LORazepam tablet 2 mg (has no administration in time range)   nicotine 21 mg/24 hr 1 patch (has no administration in time range)   ziprasidone injection 10 mg (has no administration in time range)   multivitamin tablet (1 tablet Oral Given 11/9/22 1148)   folic acid tablet 1 mg (1 mg Oral Given 11/9/22 1148)   chlordiazepoxide capsule 25 mg (has no administration in time range)   chlordiazepoxide capsule 100 mg (100 mg Oral Given 11/9/22 1147)     Medical Decision Making:   Initial Assessment:   Physician's Emergency certificate filled out by Dr. Terry prior to my assumption of care of this patient with concern for grave disability.  I will follow the laboratory studies.  Differential Diagnosis:   PTSD, substance abuse, alcohol intoxication, depression  Independently Interpreted Test(s):   I have ordered and independently interpreted EKG Reading(s) - see prior notes  Clinical Tests:   Lab Tests: Ordered and Reviewed  The following lab test(s) were unremarkable: CMP and CBC       <> Summary of Lab: Elevated alcohol level  Radiological Study: Ordered and Reviewed  Medical Tests: Ordered and Reviewed  ED Management:  ED workup notable for alcohol intoxication  otherwise with no significant acute findings.  As discussed below, will need to be observed in the ED until he meets the threshold at psychiatric facilities are comfortable with regarding his alcohol level; however, is otherwise medically stable for transfer for inpatient psychiatric treatment.  He has been started on withdrawal protocol and will be continued until he does have adequate placement and leaves the department. Physical examination and vital signs do not suggest acute withdrawal syndrome at this time.            ED Course as of 11/09/22 1412   Wed Nov 09, 2022   1103 Alcohol level elevated at 402.  Will need to be monitored until level less than 150 for psychiatric acceptance.  Given that he is quite functional at this alcohol level, will start on withdrawal protocol to prevent overt withdrawal symptoms/sequelae.  [KS]   [facility]09 [facility] for [facility] for [facility] admission.  [KS]      ED Course User Index  [KS] Magda Vincent MD       Medically cleared for psychiatry placement: 11/9/2022  2:11 PM         Clinical Impression:   Final diagnoses:  [F99] Psychiatric symptoms  [Z00.8] Medical clearance for psychiatric admission  [F43.10] PTSD (post-traumatic stress disorder) (Primary)  [F10.129] Alcohol abuse with intoxication      ED Disposition Condition    Transfer to Psych Facility Stable          ED Prescriptions    None       Follow-up Information    None          Magda Vincent MD  11/09/22 1412

## 2022-11-10 VITALS
RESPIRATION RATE: 18 BRPM | DIASTOLIC BLOOD PRESSURE: 94 MMHG | HEIGHT: 71 IN | BODY MASS INDEX: 26.6 KG/M2 | HEART RATE: 82 BPM | SYSTOLIC BLOOD PRESSURE: 152 MMHG | TEMPERATURE: 98 F | WEIGHT: 190 LBS | OXYGEN SATURATION: 98 %

## 2022-12-22 ENCOUNTER — HOSPITAL ENCOUNTER (EMERGENCY)
Facility: HOSPITAL | Age: 62
Discharge: HOME | End: 2022-12-22
Attending: EMERGENCY MEDICINE
Payer: MEDICAID

## 2022-12-22 VITALS
TEMPERATURE: 99 F | HEIGHT: 71 IN | RESPIRATION RATE: 18 BRPM | HEART RATE: 90 BPM | WEIGHT: 191.81 LBS | BODY MASS INDEX: 26.85 KG/M2 | OXYGEN SATURATION: 98 % | DIASTOLIC BLOOD PRESSURE: 98 MMHG | SYSTOLIC BLOOD PRESSURE: 150 MMHG

## 2022-12-22 DIAGNOSIS — F10.20 ALCOHOLISM: Primary | ICD-10-CM

## 2022-12-22 DIAGNOSIS — F10.920 ALCOHOLIC INTOXICATION WITHOUT COMPLICATION: ICD-10-CM

## 2022-12-22 PROCEDURE — 99283 EMERGENCY DEPT VISIT LOW MDM: CPT

## 2022-12-22 NOTE — ED PROVIDER NOTES
"Encounter Date: 12/22/2022       History     Chief Complaint   Patient presents with    Detox     Pt states he wants detox.  Denies SI/HI     61yo male presents with request for help with housing and getting into detox for alcohol. He states he drinks "a lot" of vodka daily. He denies hallucinations, SI or HI. He states he otherwise feels physicialy well.    Review of patient's allergies indicates:  No Known Allergies  Past Medical History:   Diagnosis Date    Alcohol abuse     Crohn disease     PTSD (post-traumatic stress disorder)      History reviewed. No pertinent surgical history.  History reviewed. No pertinent family history.  Social History     Tobacco Use    Smoking status: Every Day     Types: Cigarettes   Substance Use Topics    Alcohol use: Yes     Comment: daily     Review of Systems   Constitutional: Negative.  Negative for chills and fever.   HENT:  Negative for congestion, rhinorrhea and sore throat.    Eyes:  Negative for visual disturbance.   Respiratory:  Negative for cough and shortness of breath.    Cardiovascular:  Negative for chest pain, palpitations and leg swelling.   Gastrointestinal:  Negative for abdominal pain, diarrhea, nausea and vomiting.   Genitourinary:  Negative for dysuria, flank pain and frequency.   Musculoskeletal:  Negative for myalgias.   Skin:  Negative for rash.   Psychiatric/Behavioral:  Positive for dysphoric mood. The patient is nervous/anxious.    All other systems reviewed and are negative.    Physical Exam     Initial Vitals [12/22/22 1129]   BP Pulse Resp Temp SpO2   (!) 150/98 90 18 98.5 °F (36.9 °C) 98 %      MAP       --         Physical Exam    Nursing note and vitals reviewed.  Constitutional: He appears well-developed and well-nourished.   Mild alcohol intoxication.   HENT:   Head: Normocephalic and atraumatic.   Eyes: Conjunctivae are normal. Pupils are equal, round, and reactive to light.   Neck: Neck supple.   Normal range of motion.  Cardiovascular:  " Normal rate, regular rhythm, normal heart sounds and intact distal pulses.           Pulmonary/Chest: Breath sounds normal.   Abdominal: Abdomen is soft. Bowel sounds are normal. There is no abdominal tenderness.   Musculoskeletal:         General: No tenderness or edema. Normal range of motion.      Cervical back: Normal range of motion and neck supple.      Comments: Bilateral calves are symmetric, normal in appearance, no tenderness to palpation.     Neurological: He is alert and oriented to person, place, and time.   Skin: Skin is warm and dry.   Psychiatric: He has a normal mood and affect.       ED Course   Procedures  Labs Reviewed - No data to display       Imaging Results    None          Medications - No data to display  Medical Decision Making:   Initial Assessment:   61yo male requesting help with detox and housing. He is given information on shelters and detox facilities. He is agreeable to this plan.                        Clinical Impression:   Final diagnoses:  [F10.20] Alcoholism (Primary)  [F10.920] Alcoholic intoxication without complication        ED Disposition Condition    Discharge Stable          ED Prescriptions    None       Follow-up Information       Follow up With Specialties Details Why Contact Info    [facility] Emergency Medicine  As needed [address] [facility] [id number] [phone]             Zack Lai MD  12/22/22 1363

## 2023-02-23 ENCOUNTER — HOSPITAL ENCOUNTER (EMERGENCY)
Facility: HOSPITAL | Age: 63
Discharge: PSYCHIATRIC OR SUBSTANCE USE FACILITY | End: 2023-02-24
Attending: FAMILY MEDICINE
Payer: MEDICAID

## 2023-02-23 DIAGNOSIS — R45.89 SUICIDAL BEHAVIOR: ICD-10-CM

## 2023-02-23 LAB
ALBUMIN SERPL-MCNC: 4.1 G/DL (ref 3.4–4.8)
ALBUMIN/GLOB SERPL: 1.3 RATIO (ref 1.1–2)
ALP SERPL-CCNC: 69 UNIT/L (ref 40–150)
ALT SERPL-CCNC: 33 UNIT/L (ref 0–55)
AMPHET UR QL SCN: NEGATIVE
APPEARANCE UR: CLEAR
AST SERPL-CCNC: 48 UNIT/L (ref 5–34)
BACTERIA #/AREA URNS AUTO: ABNORMAL /HPF
BARBITURATE SCN PRESENT UR: NEGATIVE
BASOPHILS # BLD AUTO: 0.02 X10(3)/MCL (ref 0–0.2)
BASOPHILS NFR BLD AUTO: 0.5 %
BENZODIAZ UR QL SCN: NEGATIVE
BILIRUB UR QL STRIP.AUTO: NEGATIVE MG/DL
BILIRUBIN DIRECT+TOT PNL SERPL-MCNC: 1.1 MG/DL
BUN SERPL-MCNC: 13.2 MG/DL (ref 8.4–25.7)
CALCIUM SERPL-MCNC: 9.2 MG/DL (ref 8.8–10)
CANNABINOIDS UR QL SCN: NEGATIVE
CHLORIDE SERPL-SCNC: 106 MMOL/L (ref 98–107)
CK SERPL-CCNC: 205 U/L (ref 30–200)
CO2 SERPL-SCNC: 28 MMOL/L (ref 23–31)
COCAINE UR QL SCN: NEGATIVE
COLOR UR AUTO: ABNORMAL
CREAT SERPL-MCNC: 0.72 MG/DL (ref 0.73–1.18)
EOSINOPHIL # BLD AUTO: 0.03 X10(3)/MCL (ref 0–0.9)
EOSINOPHIL NFR BLD AUTO: 0.7 %
ERYTHROCYTE [DISTWIDTH] IN BLOOD BY AUTOMATED COUNT: 14.7 % (ref 11.5–17)
ETHANOL SERPL-MCNC: 215 MG/DL
ETHANOL SERPL-MCNC: 361 MG/DL
FENTANYL UR QL SCN: NEGATIVE
FLUAV AG UPPER RESP QL IA.RAPID: NOT DETECTED
FLUBV AG UPPER RESP QL IA.RAPID: NOT DETECTED
GFR SERPLBLD CREATININE-BSD FMLA CKD-EPI: >60 MLS/MIN/1.73/M2
GLOBULIN SER-MCNC: 3.2 GM/DL (ref 2.4–3.5)
GLUCOSE SERPL-MCNC: 96 MG/DL (ref 82–115)
GLUCOSE UR QL STRIP.AUTO: NORMAL MG/DL
HCT VFR BLD AUTO: 41.2 % (ref 42–52)
HGB BLD-MCNC: 14.1 G/DL (ref 14–18)
HYALINE CASTS #/AREA URNS LPF: ABNORMAL /LPF
IMM GRANULOCYTES # BLD AUTO: 0.01 X10(3)/MCL (ref 0–0.04)
IMM GRANULOCYTES NFR BLD AUTO: 0.2 %
KETONES UR QL STRIP.AUTO: ABNORMAL MG/DL
LEUKOCYTE ESTERASE UR QL STRIP.AUTO: NEGATIVE UNIT/L
LYMPHOCYTES # BLD AUTO: 1.68 X10(3)/MCL (ref 0.6–4.6)
LYMPHOCYTES NFR BLD AUTO: 40.1 %
MAGNESIUM SERPL-MCNC: 2 MG/DL (ref 1.6–2.6)
MCH RBC QN AUTO: 30.7 PG
MCHC RBC AUTO-ENTMCNC: 34.2 G/DL (ref 33–36)
MCV RBC AUTO: 89.8 FL (ref 80–94)
MDMA UR QL SCN: NEGATIVE
MONOCYTES # BLD AUTO: 0.48 X10(3)/MCL (ref 0.1–1.3)
MONOCYTES NFR BLD AUTO: 11.5 %
MUCOUS THREADS URNS QL MICRO: ABNORMAL /LPF
NEUTROPHILS # BLD AUTO: 1.97 X10(3)/MCL (ref 2.1–9.2)
NEUTROPHILS NFR BLD AUTO: 47 %
NITRITE UR QL STRIP.AUTO: NEGATIVE
NRBC BLD AUTO-RTO: 0 %
OPIATES UR QL SCN: NEGATIVE
PCP UR QL: NEGATIVE
PH UR STRIP.AUTO: 6 [PH]
PH UR: 6 [PH] (ref 3–11)
PLATELET # BLD AUTO: 256 X10(3)/MCL (ref 130–400)
PMV BLD AUTO: 8.8 FL (ref 7.4–10.4)
POTASSIUM SERPL-SCNC: 3.7 MMOL/L (ref 3.5–5.1)
PROT SERPL-MCNC: 7.3 GM/DL (ref 5.8–7.6)
PROT UR QL STRIP.AUTO: ABNORMAL MG/DL
RBC # BLD AUTO: 4.59 X10(6)/MCL (ref 4.7–6.1)
RBC #/AREA URNS AUTO: ABNORMAL /HPF
RBC UR QL AUTO: NEGATIVE UNIT/L
SARS-COV-2 RNA RESP QL NAA+PROBE: NOT DETECTED
SODIUM SERPL-SCNC: 146 MMOL/L (ref 136–145)
SP GR UR STRIP.AUTO: 1.02
SQUAMOUS #/AREA URNS LPF: ABNORMAL /HPF
TROPONIN I SERPL-MCNC: <0.01 NG/ML (ref 0–0.04)
UROBILINOGEN UR STRIP-ACNC: NORMAL MG/DL
WBC # SPEC AUTO: 4.2 X10(3)/MCL (ref 4.5–11.5)
WBC #/AREA URNS AUTO: ABNORMAL /HPF

## 2023-02-23 PROCEDURE — 25000003 PHARM REV CODE 250: Performed by: EMERGENCY MEDICINE

## 2023-02-23 PROCEDURE — 82077 ASSAY SPEC XCP UR&BREATH IA: CPT | Performed by: FAMILY MEDICINE

## 2023-02-23 PROCEDURE — 81001 URINALYSIS AUTO W/SCOPE: CPT | Mod: 59 | Performed by: FAMILY MEDICINE

## 2023-02-23 PROCEDURE — 93005 ELECTROCARDIOGRAM TRACING: CPT

## 2023-02-23 PROCEDURE — 83735 ASSAY OF MAGNESIUM: CPT | Performed by: FAMILY MEDICINE

## 2023-02-23 PROCEDURE — 96372 THER/PROPH/DIAG INJ SC/IM: CPT | Mod: 59

## 2023-02-23 PROCEDURE — 99291 CRITICAL CARE FIRST HOUR: CPT | Mod: 25

## 2023-02-23 PROCEDURE — 85025 COMPLETE CBC W/AUTO DIFF WBC: CPT | Performed by: FAMILY MEDICINE

## 2023-02-23 PROCEDURE — 63600175 PHARM REV CODE 636 W HCPCS

## 2023-02-23 PROCEDURE — 80053 COMPREHEN METABOLIC PANEL: CPT | Performed by: FAMILY MEDICINE

## 2023-02-23 PROCEDURE — 0240U COVID/FLU A&B PCR: CPT | Performed by: FAMILY MEDICINE

## 2023-02-23 PROCEDURE — 80307 DRUG TEST PRSMV CHEM ANLYZR: CPT | Performed by: FAMILY MEDICINE

## 2023-02-23 PROCEDURE — 82550 ASSAY OF CK (CPK): CPT | Performed by: FAMILY MEDICINE

## 2023-02-23 PROCEDURE — 84484 ASSAY OF TROPONIN QUANT: CPT | Performed by: FAMILY MEDICINE

## 2023-02-23 PROCEDURE — 96374 THER/PROPH/DIAG INJ IV PUSH: CPT

## 2023-02-23 RX ORDER — LORAZEPAM 2 MG/ML
2 INJECTION INTRAMUSCULAR
Status: COMPLETED | OUTPATIENT
Start: 2023-02-23 | End: 2023-02-23

## 2023-02-23 RX ORDER — LORAZEPAM 2 MG/ML
INJECTION INTRAMUSCULAR
Status: COMPLETED
Start: 2023-02-23 | End: 2023-02-23

## 2023-02-23 RX ORDER — HALOPERIDOL 5 MG/ML
INJECTION INTRAMUSCULAR
Status: COMPLETED
Start: 2023-02-23 | End: 2023-02-23

## 2023-02-23 RX ORDER — LORAZEPAM 1 MG/1
2 TABLET ORAL EVERY 4 HOURS PRN
Status: DISCONTINUED | OUTPATIENT
Start: 2023-02-23 | End: 2023-02-24 | Stop reason: HOSPADM

## 2023-02-23 RX ORDER — HALOPERIDOL 5 MG/ML
5 INJECTION INTRAMUSCULAR
Status: COMPLETED | OUTPATIENT
Start: 2023-02-23 | End: 2023-02-23

## 2023-02-23 RX ORDER — LORAZEPAM 2 MG/ML
2 INJECTION INTRAMUSCULAR EVERY 4 HOURS PRN
Status: DISCONTINUED | OUTPATIENT
Start: 2023-02-23 | End: 2023-02-24 | Stop reason: HOSPADM

## 2023-02-23 RX ADMIN — LORAZEPAM 2 MG: 2 INJECTION INTRAMUSCULAR at 02:02

## 2023-02-23 RX ADMIN — HALOPERIDOL LACTATE 5 MG: 5 INJECTION, SOLUTION INTRAMUSCULAR at 01:02

## 2023-02-23 RX ADMIN — LORAZEPAM 2 MG: 1 TABLET ORAL at 10:02

## 2023-02-23 RX ADMIN — LORAZEPAM 2 MG: 2 INJECTION INTRAMUSCULAR; INTRAVENOUS at 02:02

## 2023-02-23 RX ADMIN — HALOPERIDOL 5 MG: 5 INJECTION INTRAMUSCULAR at 01:02

## 2023-02-23 NOTE — ED PROVIDER NOTES
Name: Triston White   Age: 63 y.o.  Sex: male    Chief complaint:   Chief Complaint   Patient presents with    Suicidal     [facility]      Patient arrived with: EMS  History obtained from: Could not obtain    Subjective:   63-year-old male with a history of PTSD, corona, ETOH abuse that presents to emergency department for psychiatric evaluation auscultation.  [facility] sign by the [facility] and came into the emergency department for medical clearance for psychiatric admission.  Per [facility] patient had suicide ideations, unknown plan.      On my evaluation of the patient he was extremely agitated and angry, trying to get into fights with everyone.  I tried to verbally deescalate multiple times and get a further history but was unable to.    Past Medical History:   Diagnosis Date    Alcohol abuse     Crohn disease     PTSD (post-traumatic stress disorder)      No past surgical history on file.  Social History     Socioeconomic History    Marital status: Single   Tobacco Use    Smoking status: Every Day     Types: Cigarettes   Substance and Sexual Activity    Alcohol use: Yes     Comment: daily     Review of patient's allergies indicates:  No Known Allergies     Review of Systems   Unable to perform ROS: Psychiatric disorder        Objective:     Vitals:    02/23/23 1420   BP: 118/74   Pulse: 81   Resp: 16   Temp: 97.7 °F (36.5 °C)        Physical Exam  Constitutional:       Appearance: He is not toxic-appearing.      Comments: Intoxicated   HENT:      Head: Normocephalic and atraumatic.   Pulmonary:      Effort: Pulmonary effort is normal. No respiratory distress.   Musculoskeletal:         General: No deformity. Normal range of motion.      Cervical back: Normal range of motion.   Skin:     General: Skin is warm and dry.   Neurological:      General: No focal deficit present.      Mental Status: He is alert and oriented to person, place, and time.   Psychiatric:         Attention and Perception: He is inattentive.          Mood and Affect: Affect is angry and inappropriate.         Behavior: Behavior is agitated and aggressive.         Judgment: Judgment is impulsive.        Records:  Nursing records and triage records reviewed  Prior records reviewed    Medical decision making:   Presents to emergency department [facility] ideations [facility], arrived with a pec for medical clearance.  Patient was extremely agitated and aggressive.  Attempted to get into fight with multiple security guards.  I tried to verbally deescalate multiple times without success.  Patient needs to be chemically sedated.  Will get psychiatric labs for evaluation.      ED Course as of 02/23/23 1515   Thu Feb 23, 2023   1447 EKG is nonischemic.    Troponin within normal limits.  CPK within normal limits.  No leukocytosis, anemia, thrombocytopenia.  No severe electrolyte abnormality.  No DEVON hyperglycemia.  Liver enzymes are within normal limits.  ETOH elevated at 361. [RK]   1453 My interpretation of chest x-ray shows no acute findings [RK]   1514 COVID, flu negative.     [RK]   1515 Patient is medical cleared will be okay to transfer to psychiatric facility. [RK]      ED Course User Index  [RK] Norberto Romeo MD          EKG:  ECG Results              EKG 12-lead (Preliminary result)  Result time 02/23/23 14:47:21      Wet Read by Norberto Romeo MD (02/23/23 14:47:21, [facility], Emergency Medicine)    Normal sinus rhythm at a rate of 75, no signs of ST elevation or depression, normal axis, normal intervals with a QTC of 448                      In process by Interface, Lab In [facility] (02/23/23 14:45:24)                   Narrative:    Test Reason : R45.89,    Vent. Rate : 075 BPM     Atrial Rate : 075 BPM     P-R Int : 204 ms          QRS Dur : 090 ms      QT Int : 402 ms       P-R-T Axes : 009 001 068 degrees     QTc Int : 448 ms    Normal sinus rhythm  Normal ECG  When compared with ECG of 09-NOV-2022 11:43,  No  significant change was found    Referred By: [email or web address]   SELF           Confirmed By:                                      Critical Care    Date/Time: 2/23/2023 3:15 PM  Performed by: Norberto Romeo MD  Authorized by: Norberto Romeo MD   Total critical care time (exclusive of procedural time) : 35 minutes  Comments: Upon my evaluation, this patient had a high probability of imminent or life-threatening deterioration due to agitation and aggression needing IV/IM sedation, which required my direct attention, intervention, and personal management.    I have personally provided 35 minutes of critical care time exclusive of time spent on separately billed procedures. Time includes review of chart, history and physical exam of the patient, review of laboratory data, review of radiology results, discussion with consultants, and monitoring for potential decompensation. Interventions were performed as documented above.              Diagnosis:  Final diagnoses:  [R45.89] Suicidal behavior     ED Prescriptions    None       Follow-up Information    None         Norberto Romeo M.D.  Emergency Medicine Physician     (Please note that this chart was completed via voice to text dictation. There may be typographical errors or substitutions that are unintentional, or uncorrected. Every attempt was made to proofread the chart prior to completion. If there are any questions, please contact the physician for final clarification).         Norberto Romeo MD  02/23/23 8209

## 2023-02-24 VITALS
SYSTOLIC BLOOD PRESSURE: 127 MMHG | WEIGHT: 175 LBS | HEART RATE: 92 BPM | RESPIRATION RATE: 16 BRPM | DIASTOLIC BLOOD PRESSURE: 70 MMHG | TEMPERATURE: 98 F | HEIGHT: 70 IN | BODY MASS INDEX: 25.05 KG/M2 | OXYGEN SATURATION: 97 %

## 2023-02-24 LAB — ETHANOL SERPL-MCNC: 59 MG/DL

## 2023-02-24 PROCEDURE — 25000003 PHARM REV CODE 250: Performed by: EMERGENCY MEDICINE

## 2023-02-24 PROCEDURE — 82077 ASSAY SPEC XCP UR&BREATH IA: CPT | Performed by: EMERGENCY MEDICINE

## 2023-02-24 RX ORDER — OLANZAPINE 5 MG/1
5 TABLET ORAL
COMMUNITY
Start: 2023-01-13

## 2023-02-24 RX ORDER — LANOLIN ALCOHOL/MO/W.PET/CERES
800 CREAM (GRAM) TOPICAL
COMMUNITY
Start: 2023-01-13

## 2023-02-24 RX ORDER — ALUMINUM HYDROXIDE, MAGNESIUM HYDROXIDE, AND SIMETHICONE 2400; 240; 2400 MG/30ML; MG/30ML; MG/30ML
30 SUSPENSION ORAL
COMMUNITY
Start: 2021-10-19

## 2023-02-24 RX ORDER — TRAZODONE HYDROCHLORIDE 100 MG/1
1 TABLET ORAL NIGHTLY
COMMUNITY
Start: 2023-01-13

## 2023-02-24 RX ORDER — BENAZEPRIL HYDROCHLORIDE 10 MG/1
20 TABLET ORAL
COMMUNITY
Start: 2021-10-19

## 2023-02-24 RX ORDER — CLONIDINE HYDROCHLORIDE 0.1 MG/1
0.1 TABLET ORAL
COMMUNITY
Start: 2021-10-19

## 2023-02-24 RX ORDER — LANOLIN ALCOHOL/MO/W.PET/CERES
100 CREAM (GRAM) TOPICAL
COMMUNITY
Start: 2021-10-19

## 2023-02-24 RX ORDER — AMLODIPINE BESYLATE 10 MG/1
10 TABLET ORAL
COMMUNITY
Start: 2022-10-09

## 2023-02-24 RX ORDER — AMOXICILLIN 250 MG
CAPSULE ORAL
COMMUNITY
Start: 2022-10-09

## 2023-02-24 RX ORDER — SILDENAFIL 100 MG/1
100 TABLET, FILM COATED ORAL
COMMUNITY
Start: 2023-01-13

## 2023-02-24 RX ORDER — VENLAFAXINE HYDROCHLORIDE 150 MG/1
1 CAPSULE, EXTENDED RELEASE ORAL DAILY
COMMUNITY
Start: 2023-01-13

## 2023-02-24 RX ADMIN — LORAZEPAM 2 MG: 1 TABLET ORAL at 02:02
